# Patient Record
(demographics unavailable — no encounter records)

---

## 2025-03-20 NOTE — HISTORY OF PRESENT ILLNESS
[Former] : former [< 20 pack-years] : < 20 pack-years [TextBox_4] : Mr. BENÍTEZ is a 62 year man with a medical history significant for COPD and severe ETHAN presenting today to the clinic for further evaluation of these conditions. He presents for a second opinion  Has been having shortness of breath with exertion for the past few years, at least since 2022. Was a former smoker, but only about 13PY Had a PFT >1 year ago, and had a CT long ago Currently on mometasone  Recently seen cardiologist with Dr Pleitez  Occupational Hx: , [employer] history, never deployed, spent time in [facility] and [employer] exposure [TextBox_11] : 1/2 [TextBox_13] : 25 [YearQuit] : 2012

## 2025-05-21 NOTE — HISTORY OF PRESENT ILLNESS
[Former] : former [< 20 pack-years] : < 20 pack-years [TextBox_4] : Mr. BENÍTEZ is a 62 year man with a medical history significant for COPD and severe ETHAN presenting today to the clinic for further evaluation of these conditions. He presents for a second opinion  Has been having shortness of breath with exertion for the past few years, at least since 2022. Was a former smoker, but only about 13PY Had a PFT >1 year ago, and had a CT long ago Currently on mometasone  Recently seen cardiologist with Dr Pleitez  Occupational Hx: , [employer] history, never deployed, [facility] [employer] [facility]  Interval history:  At our last visit, patient was sent for further workup with PFT and CT scan of the chest. He was also told to continue with his current inhalers, which he still reports are not improving his dyspnea on exertion. His PFT performed at the end of March did demonstrate moderate obstruction with air trapping, consistent with a diagnosis of asthma.  [TextBox_11] : 1/2 [TextBox_13] : 25 [YearQuit] : 2012